# Patient Record
Sex: FEMALE | Race: WHITE | NOT HISPANIC OR LATINO | URBAN - METROPOLITAN AREA
[De-identification: names, ages, dates, MRNs, and addresses within clinical notes are randomized per-mention and may not be internally consistent; named-entity substitution may affect disease eponyms.]

---

## 2017-03-23 ENCOUNTER — APPOINTMENT (OUTPATIENT)
Dept: WOMENS IMAGING | Facility: HOSPITAL | Age: 57
End: 2017-03-23

## 2017-03-23 PROCEDURE — 77063 BREAST TOMOSYNTHESIS BI: CPT | Performed by: RADIOLOGY

## 2017-03-23 PROCEDURE — 77067 SCR MAMMO BI INCL CAD: CPT | Performed by: RADIOLOGY

## 2017-03-23 PROCEDURE — G0202 SCR MAMMO BI INCL CAD: HCPCS | Performed by: RADIOLOGY

## 2017-08-17 ENCOUNTER — OFFICE VISIT (OUTPATIENT)
Dept: ORTHOPEDIC SURGERY | Facility: CLINIC | Age: 57
End: 2017-08-17

## 2017-08-17 VITALS
WEIGHT: 120 LBS | HEIGHT: 61 IN | HEART RATE: 65 BPM | BODY MASS INDEX: 22.66 KG/M2 | SYSTOLIC BLOOD PRESSURE: 132 MMHG | DIASTOLIC BLOOD PRESSURE: 85 MMHG

## 2017-08-17 DIAGNOSIS — R52 PAIN: Primary | ICD-10-CM

## 2017-08-17 DIAGNOSIS — M77.11 LATERAL EPICONDYLITIS, RIGHT ELBOW: ICD-10-CM

## 2017-08-17 PROCEDURE — 73070 X-RAY EXAM OF ELBOW: CPT | Performed by: ORTHOPAEDIC SURGERY

## 2017-08-17 PROCEDURE — 20605 DRAIN/INJ JOINT/BURSA W/O US: CPT | Performed by: ORTHOPAEDIC SURGERY

## 2017-08-17 PROCEDURE — 99203 OFFICE O/P NEW LOW 30 MIN: CPT | Performed by: ORTHOPAEDIC SURGERY

## 2017-08-17 RX ORDER — BETAMETHASONE SODIUM PHOSPHATE AND BETAMETHASONE ACETATE 3; 3 MG/ML; MG/ML
6 INJECTION, SUSPENSION INTRA-ARTICULAR; INTRALESIONAL; INTRAMUSCULAR; SOFT TISSUE
Status: COMPLETED | OUTPATIENT
Start: 2017-08-17 | End: 2017-08-17

## 2017-08-17 RX ORDER — CETIRIZINE HYDROCHLORIDE 10 MG/1
10 TABLET ORAL DAILY
COMMUNITY

## 2017-08-17 RX ORDER — LIDOCAINE HYDROCHLORIDE 10 MG/ML
1 INJECTION, SOLUTION EPIDURAL; INFILTRATION; INTRACAUDAL; PERINEURAL
Status: COMPLETED | OUTPATIENT
Start: 2017-08-17 | End: 2017-08-17

## 2017-08-17 RX ORDER — NABUMETONE 500 MG/1
500 TABLET, FILM COATED ORAL 2 TIMES DAILY
Qty: 60 TABLET | Refills: 5 | Status: SHIPPED | OUTPATIENT
Start: 2017-08-17 | End: 2018-01-13

## 2017-08-17 RX ADMIN — BETAMETHASONE SODIUM PHOSPHATE AND BETAMETHASONE ACETATE 6 MG: 3; 3 INJECTION, SUSPENSION INTRA-ARTICULAR; INTRALESIONAL; INTRAMUSCULAR; SOFT TISSUE at 13:50

## 2017-08-17 RX ADMIN — LIDOCAINE HYDROCHLORIDE 1 ML: 10 INJECTION, SOLUTION EPIDURAL; INFILTRATION; INTRACAUDAL; PERINEURAL at 13:50

## 2017-08-17 NOTE — PROGRESS NOTES
Subjective: Right elbow pain     Patient ID: Ramandeep Holbrook is a 57 y.o. female.    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  Significant pain with carrying any type of heavy object.  Has taken occasional anti-inflammatories although most tender onset her stomach.  She has worn tennis elbow band without significant relief       Social History     Occupational History   • Not on file.     Social History Main Topics   • Smoking status: Never Smoker   • Smokeless tobacco: Not on file   • Alcohol use Yes   • Drug use: Not on file   • Sexual activity: Not on file      Review of Systems   Constitutional: Positive for diaphoresis and unexpected weight change. Negative for chills and fever.   HENT: Negative for hearing loss, nosebleeds, sore throat and tinnitus.    Eyes: Negative for pain and visual disturbance.   Respiratory: Negative for cough, shortness of breath and wheezing.    Cardiovascular: Negative for chest pain and palpitations.   Gastrointestinal: Negative for abdominal pain, diarrhea, nausea and vomiting.   Endocrine: Negative for cold intolerance, heat intolerance and polydipsia.   Genitourinary: Negative for difficulty urinating, dysuria and hematuria.   Musculoskeletal: Positive for joint swelling. Negative for arthralgias and myalgias.   Skin: Negative for rash and wound.   Allergic/Immunologic: Negative for environmental allergies.   Neurological: Negative for dizziness, syncope and numbness.   Hematological: Does not bruise/bleed easily.   Psychiatric/Behavioral: Negative for dysphoric mood and sleep disturbance. The patient is not nervous/anxious.           Past Medical History:   Diagnosis Date   • Fracture of wrist    • Fracture, foot      History reviewed. No pertinent surgical history.  Family History   Problem Relation Age of Onset   • Cancer Mother    • Cancer Maternal Aunt    • Diabetes Paternal Aunt    • Cancer Paternal Aunt          Objective:  Vitals:    08/17/17 1314   BP: 132/85   Pulse: 65     Last 3 weights    08/17/17  1314   Weight: 120 lb (54.4 kg)     Body mass index is 22.67 kg/(m^2).       Ortho Exam  AP lateral x-rays of the elbow to evaluate her chief complaint is completely within normal limits.  She is alert and oriented ×3.  Head is eyes nose and throat are normocephalic and pupils equal round reactive to light sclerae clear.  Right upper extremity shows no motor deficit good distal pulses.  Ulnar, radial and median nerve function is intact.  Is marked tenderness over the lateral upper condyle his pain with dorsi flexion and supination against resistance.  Skin is cool to touch and his no sensory loss.  She has full range of motion of the elbow and the shoulder.  Deltoid function is 5 over 5.  Biceps function is 5 over 5.  No significant swelling indicating any type of vascular compromise.  Again most nonsteroidals upset her stomach.    Assessment:       1. Pain    2. Lateral epicondylitis, right elbow          Plan:      reviewed the x-ray findings and physical findings with the patient.  I'll continue wearing the tennis elbow band and instructed her how to wear down the arm.  He her instructions on stretching exercises in the shower.  We'll begin Relafen 500 mg 2 pills once a day with food.  Also will inject the right lateral upper condyle and that was accomplished after sterile prepping with 1 cc lidocaine 1 cc Celestone.  Postinjection instructions given to the patient.  Return to see me in 4 weeks      Work Status:    [facility] query complete.    Orders:  Orders Placed This Encounter   Procedures   • Medium Joint Arthrocentesis   • XR Elbow  2 View Right       Medications:  New Medications Ordered This Visit   Medications   • sertraline (ZOLOFT) 50 MG tablet   • Multiple Vitamin (MULTI VITAMIN DAILY PO)     Sig: Take  by mouth.   • cetirizine (zyrTEC) 10 MG tablet     Sig: Take 10 mg by mouth Daily.   • nabumetone (RELAFEN) 500 MG tablet     Sig: Take 1 tablet by mouth 2 (Two) Times a Day.     Dispense:  60 tablet     Refill:  5       Followup:  Return in about 4 weeks (around 9/14/2017).          Dragon transcription disclaimer     Much of this encounter note is an electronic transcription/translation of spoken language to printed text. The electronic translation of spoken language may permit erroneous, or at times, nonsensical words or phrases to be inadvertently transcribed. Although I have reviewed the note for such errors, some may still exist.  Medium Joint Arthrocentesis  Date/Time: 8/17/2017 1:50 PM  Consent given by: patient  Site marked: site marked  Timeout: Immediately prior to procedure a time out was called to verify the correct patient, procedure, equipment, support staff and site/side marked as required   Supporting Documentation  Indications: pain   Procedure Details  Location: elbow - R elbow  Preparation: Patient was prepped and draped in the usual sterile fashion  Needle size: 22 G  Approach: anterolateral  Medications administered: 1 mL lidocaine PF 1% 1 %; 6 mg betamethasone acetate-betamethasone sodium phosphate 6 (3-3) MG/ML  Patient tolerance: patient tolerated the procedure well with no immediate complications

## 2017-09-25 ENCOUNTER — TELEPHONE (OUTPATIENT)
Dept: ORTHOPEDIC SURGERY | Facility: CLINIC | Age: 57
End: 2017-09-25

## 2017-09-25 NOTE — TELEPHONE ENCOUNTER
Injection helped for about 4 weeks. Pain is back. Can she get another one or what do you recommend?  She took the Relafen for about 3 weeks and then stopped it.  She started back on it but has not helped.

## 2017-09-25 NOTE — TELEPHONE ENCOUNTER
She wants to give the medicine longer to work and if no improvement, will call back for the scheduling of the MRI.

## 2018-01-13 ENCOUNTER — OFFICE VISIT (OUTPATIENT)
Dept: RETAIL CLINIC | Facility: CLINIC | Age: 58
End: 2018-01-13

## 2018-01-13 VITALS
HEART RATE: 96 BPM | TEMPERATURE: 97.7 F | SYSTOLIC BLOOD PRESSURE: 119 MMHG | DIASTOLIC BLOOD PRESSURE: 64 MMHG | OXYGEN SATURATION: 97 %

## 2018-01-13 DIAGNOSIS — J01.90 ACUTE SINUSITIS, RECURRENCE NOT SPECIFIED, UNSPECIFIED LOCATION: Primary | ICD-10-CM

## 2018-01-13 DIAGNOSIS — J98.01 BRONCHOSPASM: ICD-10-CM

## 2018-01-13 DIAGNOSIS — R50.9 FEVER, UNSPECIFIED FEVER CAUSE: ICD-10-CM

## 2018-01-13 LAB
EXPIRATION DATE: NORMAL
FLUAV AG NPH QL: NORMAL
FLUBV AG NPH QL: NORMAL
INTERNAL CONTROL: NORMAL
Lab: NORMAL

## 2018-01-13 PROCEDURE — 87804 INFLUENZA ASSAY W/OPTIC: CPT | Performed by: NURSE PRACTITIONER

## 2018-01-13 PROCEDURE — 99213 OFFICE O/P EST LOW 20 MIN: CPT | Performed by: NURSE PRACTITIONER

## 2018-01-13 RX ORDER — AMOXICILLIN 875 MG/1
875 TABLET, COATED ORAL EVERY 12 HOURS SCHEDULED
Qty: 20 TABLET | Refills: 0 | Status: SHIPPED | OUTPATIENT
Start: 2018-01-13 | End: 2018-01-23

## 2018-01-13 RX ORDER — ALBUTEROL SULFATE 90 UG/1
2 AEROSOL, METERED RESPIRATORY (INHALATION) EVERY 4 HOURS PRN
Qty: 1 INHALER | Refills: 0 | Status: SHIPPED | OUTPATIENT
Start: 2018-01-13

## 2018-01-13 RX ORDER — FLUCONAZOLE 100 MG/1
100 TABLET ORAL ONCE
Qty: 2 TABLET | Refills: 0 | Status: SHIPPED | OUTPATIENT
Start: 2018-01-13 | End: 2018-01-13

## 2018-01-13 NOTE — PATIENT INSTRUCTIONS

## 2018-01-13 NOTE — PROGRESS NOTES
Melissa Holbrook is a 57 y.o.. female.     Fever    This is a new problem. The current episode started in the past 7 days. The problem has been unchanged. Associated symptoms include abdominal pain, congestion, coughing, diarrhea (Thursday), ear pain (full), headaches and a sore throat. Pertinent negatives include no nausea or vomiting. Treatments tried: cough/cold otc med. The treatment provided no relief.       The following portions of the patient's history were reviewed and updated as appropriate: allergies, current medications, past family history, past medical history, past social history, past surgical history and problem list.    Review of Systems   Constitutional: Positive for fever (100.0 ).   HENT: Positive for congestion, ear pain (full), rhinorrhea and sore throat.    Eyes: Negative.    Respiratory: Positive for cough.    Gastrointestinal: Positive for abdominal pain and diarrhea (Thursday). Negative for nausea and vomiting.   Genitourinary: Negative.    Musculoskeletal: Positive for arthralgias.   Neurological: Positive for headaches.       Objective     Vitals:    01/13/18 1502   BP: 119/64   Pulse: 96   Temp: 97.7 °F (36.5 °C)   TempSrc: Tympanic   SpO2: 97%       Physical Exam   Constitutional: She is oriented to person, place, and time. She appears well-developed and well-nourished.   HENT:   Head: Normocephalic and atraumatic.   Right Ear: Tympanic membrane is not erythematous.   Left Ear: Tympanic membrane normal. Tympanic membrane is not erythematous.   Nose: Mucosal edema present. Right sinus exhibits frontal sinus tenderness. Right sinus exhibits no maxillary sinus tenderness. Left sinus exhibits frontal sinus tenderness. Left sinus exhibits no maxillary sinus tenderness.   Mouth/Throat: Posterior oropharyngeal erythema (slight) present.   Right TM with clear fluid noted  PND   Eyes: Conjunctivae are normal. Pupils are equal, round, and reactive to light.   Cardiovascular: Normal rate  and regular rhythm.    No murmur heard.  Pulmonary/Chest: Effort normal. She has no wheezes. She has no rhonchi. She has no rales.   Musculoskeletal: Normal range of motion.   Lymphadenopathy:     She has cervical adenopathy.   Neurological: She is alert and oriented to person, place, and time.   Skin: Skin is warm and dry.   Vitals reviewed.      Lab Results (last 24 hours)     Procedure Component Value Units Date/Time    POC Influenza A / B [[id number]]  (Normal) Collected:  01/13/18 1516    Specimen:  Swab Updated:  01/13/18 1517     Rapid Influenza A Ag neg     Rapid Influenza B Ag neg     Internal Control Passed     Lot Number [id number]     Expiration Date 2019-12          Assessment/Plan   Ramandeep was seen today for fever.    Diagnoses and all orders for this visit:    Acute sinusitis, recurrence not specified, unspecified location  -     amoxicillin (AMOXIL) 875 MG tablet; Take 1 tablet by mouth Every 12 (Twelve) Hours for 10 days.  -     fluconazole (DIFLUCAN) 100 MG tablet; Take 1 tablet by mouth 1 (One) Time for 1 dose. After completion of amoxicillin; may repeat in 72 hours if needed    Fever, unspecified fever cause  -     POC Influenza A / B    Bronchospasm  -     albuterol (PROVENTIL HFA;VENTOLIN HFA) 108 (90 Base) MCG/ACT inhaler; Inhale 2 puffs Every 4 (Four) Hours As Needed for Wheezing or Shortness of Air (coughing episodes).        Patient Instructions   Sinusitis, Adult  Sinusitis is soreness and inflammation of your sinuses. Sinuses are hollow spaces in the bones around your face. Your sinuses are located:  · Around your eyes.  · In the middle of your forehead.  · Behind your nose.  · In your cheekbones.  Your sinuses and nasal passages are lined with a stringy fluid (mucus). Mucus normally drains out of your sinuses. When your nasal tissues become inflamed or swollen, the mucus can become trapped or blocked so air cannot flow through your sinuses. This allows bacteria, viruses, and funguses to grow,  which leads to infection.  Sinusitis can develop quickly and last for 7?10 days (acute) or for more than 12 weeks (chronic). Sinusitis often develops after a cold.  What are the causes?  This condition is caused by anything that creates swelling in the sinuses or stops mucus from draining, including:  · Allergies.  · Asthma.  · Bacterial or viral infection.  · Abnormally shaped bones between the nasal passages.  · Nasal growths that contain mucus (nasal polyps).  · Narrow sinus openings.  · Pollutants, such as chemicals or irritants in the air.  · A foreign object stuck in the nose.  · A fungal infection. This is rare.  What increases the risk?  The following factors may make you more likely to develop this condition:  · Having allergies or asthma.  · Having had a recent cold or respiratory tract infection.  · Having structural deformities or blockages in your nose or sinuses.  · Having a weak immune system.  · Doing a lot of swimming or diving.  · Overusing nasal sprays.  · Smoking.  What are the signs or symptoms?  The main symptoms of this condition are pain and a feeling of pressure around the affected sinuses. Other symptoms include:  · Upper toothache.  · Earache.  · Headache.  · Bad breath.  · Decreased sense of smell and taste.  · A cough that may get worse at night.  · Fatigue.  · Fever.  · Thick drainage from your nose. The drainage is often green and it may contain pus (purulent).  · Stuffy nose or congestion.  · Postnasal drip. This is when extra mucus collects in the throat or back of the nose.  · Swelling and warmth over the affected sinuses.  · Sore throat.  · Sensitivity to light.  How is this diagnosed?  This condition is diagnosed based on symptoms, a medical history, and a physical exam. To find out if your condition is acute or chronic, your health care provider may:  · Look in your nose for signs of nasal polyps.  · Tap over the affected sinus to check for signs of infection.  · View the inside  of your sinuses using an imaging device that has a light attached (endoscope).  If your health care provider suspects that you have chronic sinusitis, you may also:  · Be tested for allergies.  · Have a sample of mucus taken from your nose (nasal culture) and checked for bacteria.  · Have a mucus sample examined to see if your sinusitis is related to an allergy.  If your sinusitis does not respond to treatment and it lasts longer than 8 weeks, you may have an MRI or CT scan to check your sinuses. These scans also help to determine how severe your infection is.  In rare cases, a bone biopsy may be done to rule out more serious types of fungal sinus disease.  How is this treated?  Treatment for sinusitis depends on the cause and whether your condition is chronic or acute. If a virus is causing your sinusitis, your symptoms will go away on their own within 10 days. You may be given medicines to relieve your symptoms, including:  · Topical nasal decongestants. They shrink swollen nasal passages and let mucus drain from your sinuses.  · Antihistamines. These drugs block inflammation that is triggered by allergies. This can help to ease swelling in your nose and sinuses.  · Topical nasal corticosteroids. These are nasal sprays that ease inflammation and swelling in your nose and sinuses.  · Nasal saline washes. These rinses can help to get rid of thick mucus in your nose.  If your condition is caused by bacteria, you will be given an antibiotic medicine. If your condition is caused by a fungus, you will be given an antifungal medicine.  Surgery may be needed to correct underlying conditions, such as narrow nasal passages. Surgery may also be needed to remove polyps.  Follow these instructions at home:  Medicines  · Take, use, or apply over-the-counter and prescription medicines only as told by your health care provider. These may include nasal sprays.  · If you were prescribed an antibiotic medicine, take it as told by  your health care provider. Do not stop taking the antibiotic even if you start to feel better.  Hydrate and Humidify  · Drink enough water to keep your urine clear or pale yellow. Staying hydrated will help to thin your mucus.  · Use a cool mist humidifier to keep the humidity level in your home above 50%.  · Inhale steam for 10-15 minutes, 3-4 times a day or as told by your health care provider. You can do this in the bathroom while a hot shower is running.  · Limit your exposure to cool or dry air.  Rest  · Rest as much as possible.  · Sleep with your head raised (elevated).  · Make sure to get enough sleep each night.  General instructions  · Apply a warm, moist washcloth to your face 3-4 times a day or as told by your health care provider. This will help with discomfort.  · Wash your hands often with soap and water to reduce your exposure to viruses and other germs. If soap and water are not available, use hand .  · Do not smoke. Avoid being around people who are smoking (secondhand smoke).  · Keep all follow-up visits as told by your health care provider. This is important.  Contact a health care provider if:  · You have a fever.  · Your symptoms get worse.  · Your symptoms do not improve within 10 days.  Get help right away if:  · You have a severe headache.  · You have persistent vomiting.  · You have pain or swelling around your face or eyes.  · You have vision problems.  · You develop confusion.  · Your neck is stiff.  · You have trouble breathing.  This information is not intended to replace advice given to you by your health care provider. Make sure you discuss any questions you have with your health care provider.  Document Released: 12/18/2006 Document Revised: 08/13/2017 Document Reviewed: 10/12/2016  [employer] Interactive Patient Education © 2017 [employer]        Return if symptoms worsen or fail to improve with urgent care/ER.

## 2018-03-26 ENCOUNTER — APPOINTMENT (OUTPATIENT)
Dept: WOMENS IMAGING | Facility: HOSPITAL | Age: 58
End: 2018-03-26

## 2018-03-26 PROCEDURE — 77067 SCR MAMMO BI INCL CAD: CPT | Performed by: RADIOLOGY

## 2019-05-07 ENCOUNTER — APPOINTMENT (OUTPATIENT)
Dept: WOMENS IMAGING | Facility: HOSPITAL | Age: 59
End: 2019-05-07

## 2019-05-07 PROCEDURE — 77067 SCR MAMMO BI INCL CAD: CPT | Performed by: RADIOLOGY

## 2022-03-29 ENCOUNTER — APPOINTMENT (OUTPATIENT)
Dept: WOMENS IMAGING | Facility: HOSPITAL | Age: 62
End: 2022-03-29

## 2022-03-29 PROCEDURE — 77067 SCR MAMMO BI INCL CAD: CPT | Performed by: RADIOLOGY

## 2022-03-29 PROCEDURE — 77063 BREAST TOMOSYNTHESIS BI: CPT | Performed by: RADIOLOGY

## 2023-08-03 ENCOUNTER — OFFICE VISIT (OUTPATIENT)
Dept: ORTHOPEDIC SURGERY | Facility: CLINIC | Age: 63
End: 2023-08-03
Payer: COMMERCIAL

## 2023-08-03 VITALS — BODY MASS INDEX: 22.14 KG/M2 | WEIGHT: 120.3 LBS | TEMPERATURE: 97.3 F | HEIGHT: 62 IN

## 2023-08-03 DIAGNOSIS — M25.869 PATELLOFEMORAL DYSFUNCTION, UNSPECIFIED LATERALITY: ICD-10-CM

## 2023-08-03 DIAGNOSIS — M25.562 PAIN IN BOTH KNEES, UNSPECIFIED CHRONICITY: Primary | ICD-10-CM

## 2023-08-03 DIAGNOSIS — M25.561 PAIN IN BOTH KNEES, UNSPECIFIED CHRONICITY: Primary | ICD-10-CM

## 2023-08-03 RX ORDER — IBUPROFEN 200 MG
200 TABLET ORAL EVERY 6 HOURS PRN
COMMUNITY

## 2023-08-03 RX ORDER — METHYLPREDNISOLONE ACETATE 80 MG/ML
1 INJECTION, SUSPENSION INTRA-ARTICULAR; INTRALESIONAL; INTRAMUSCULAR; SOFT TISSUE
Status: COMPLETED | OUTPATIENT
Start: 2023-08-03 | End: 2023-08-03

## 2023-08-03 RX ORDER — LIDOCAINE HYDROCHLORIDE 10 MG/ML
2 INJECTION, SOLUTION EPIDURAL; INFILTRATION; INTRACAUDAL; PERINEURAL
Status: COMPLETED | OUTPATIENT
Start: 2023-08-03 | End: 2023-08-03

## 2023-08-03 RX ADMIN — METHYLPREDNISOLONE ACETATE 1 ML: 80 INJECTION, SUSPENSION INTRA-ARTICULAR; INTRALESIONAL; INTRAMUSCULAR; SOFT TISSUE at 11:31

## 2023-08-03 RX ADMIN — LIDOCAINE HYDROCHLORIDE 2 ML: 10 INJECTION, SOLUTION EPIDURAL; INFILTRATION; INTRACAUDAL; PERINEURAL at 11:31

## 2023-08-23 ENCOUNTER — HOSPITAL ENCOUNTER (OUTPATIENT)
Dept: PHYSICAL THERAPY | Facility: HOSPITAL | Age: 63
Setting detail: THERAPIES SERIES
Discharge: HOME | End: 2023-08-23
Payer: COMMERCIAL

## 2023-08-23 DIAGNOSIS — M25.861 PATELLOFEMORAL DYSFUNCTION OF RIGHT KNEE: Primary | ICD-10-CM

## 2023-08-23 PROCEDURE — 97110 THERAPEUTIC EXERCISES: CPT | Performed by: PHYSICAL THERAPIST

## 2023-08-23 PROCEDURE — 97161 PT EVAL LOW COMPLEX 20 MIN: CPT | Performed by: PHYSICAL THERAPIST

## 2023-08-23 NOTE — THERAPY EVALUATION
Outpatient Physical Therapy Ortho Initial Evaluation  RADHA Gallo     Patient Name: Ramandeep Holbrook  : 1960  MRN: [id number]  Today's Date: 2023      Visit Date: 2023    There is no problem list on file for this patient.       Past Medical History:   Diagnosis Date    Fracture of wrist     Fracture, foot         History reviewed. No pertinent surgical history.    Visit Dx:     ICD-10-CM ICD-9-CM   1. Patellofemoral dysfunction of right knee  M25.861 719.86          Patient History       Row Name 23 1700             History    Chief Complaint Pain;Difficulty with daily activities  -SP      Type of Pain Knee pain  -SP      Date Current Problem(s) Began 23  MD visit/order date  -SP      Brief Description of Current Complaint Patient with gradual onset of insidious right knee pain over the last few month.  She is active in [employer] and [employer] classes, but now has some limitation in participation due to knee pain.  Patient reports that she had recent injection to her knee and it has changed her symptoms minimally.X-rays show mild degenerative arthritic changes.  Patient describes medial aspect knee pain intermittently  -SP      Previous treatment for THIS PROBLEM Injections  -SP      Patient/Caregiver Goals Return to prior level of function;Know what to do to help the symptoms  -SP      Patient's Rating of General Health Very good  -SP      Occupation/sports/leisure activities [employer], [employer] classes, gardening  -SP      How has patient tried to help current problem? ice  -SP      What clinical tests have you had for this problem? X-ray  -SP      Results of Clinical Tests see chart  -SP         Pain     Pain Location Knee  -SP      Pain at Present 0  -SP      Pain at Worst 3  -SP      Pain Frequency Intermittent  -SP      Pain Description Sharp;Aching  -SP      What Performance Factors Make the Current Problem(s) WORSE? pilates stretches into extreme end range flexion of knee.  -SP      What  Performance Factors Make the Current Problem(s) BETTER? ice  -SP      Tolerance Time- Standing unlimited  -SP      Tolerance Time- Sitting unlimited  -SP      Tolerance Time- Walking unlimited  -SP      Is your sleep disturbed? No  -SP      Difficulties with ADL's? able to perform ADLs unlimited  -SP      Difficulties with recreational activities? difficulty participating fully in exercise classes  -SP         Fall Risk Assessment    Any falls in the past year: No  -SP         Daily Activities    Primary Language English  -SP      Are you able to read Yes  -SP      Are you able to write Yes  -SP      How does patient learn best? Listening;Reading;Demonstration;Pictures/Video  -SP      Teaching needs identified Home Exercise Program;Management of Condition  -SP      Patient is concerned about/has problems with Performing sports, recreation, and play activities  -SP      Does patient have problems with the following? Anxiety  -SP      Barriers to learning None  -SP      Pt Participated in POC and Goals Yes  -SP         Safety    Are you being hurt, hit, or frightened by anyone at home or in your life? No  -SP      Are you being neglected by a caregiver No  -SP                User Key  (r) = Recorded By, (t) = Taken By, (c) = Cosigned By      Initials Name Provider Type    SP Angella Ca, PT Physical Therapist                     PT Ortho       Row Name 08/23/23 1700       Posture/Observations    Observations Edema  -SP    Posture/Observations Comments mild edema presents medial knee  -SP       Knee Palpation    Medial Joint Line Right:;Tender  -SP       Patellar Accessory Motions    Superior glide Right:;WNL  -SP    Inferior glide Right:;WNL  -SP    Medial glide Right:;WNL  -SP    Lateral glide Right:;WNL  -SP       Knee Special Tests    Anterior drawer (ACL lesion) Right:;Negative  -SP    Valgus stress (MCL lesion) Right:;Negative  -SP    Varus stress (LCL lesion) Right:;Negative  -SP    Apley's  distraction test (meniscal lesion vs OA) Right:;Negative  -SP    Apley's compression test (meniscal lesion vs OA) Right:;Negative  -SP    Agnes's test (meniscal lesion) Right:;Negative  -SP    Bounce home test (meniscal lesion) Right:;Negative  -SP       Right Lower Ext    Rt Hip ABduction AROM WNL  -SP    Rt Hip ADduction AROM WNL  -SP    Rt Hip Extension AROM WNL  -SP    Rt Hip Flexion AROM WNL  -SP    Rt Knee Extension/Flexion AROM WNL  -SP    Rt Ankle Dorsiflexion AROM WNL  -SP    Rt Ankle Plantarflexion AROM WNL  -SP       MMT Right Lower Ext    Rt Hip Flexion MMT, Gross Movement (4+/5) good plus  -SP    Rt Hip Extension MMT, Gross Movement (4+/5) good plus  -SP    Rt Hip ABduction MMT, Gross Movement (4+/5) good plus  -SP    Rt Hip ADduction MMT, Gross Movement (4+/5) good plus  -SP    Rt Knee Extension MMT, Gross Movement (4-/5) good minus  -SP    Rt Knee Flexion MMT, Gross Movement (4-/5) good minus  -SP    Rt Ankle Plantarflexion MMT, Gross Movement (4/5) good  -SP    Rt Ankle Dorsiflexion MMT, Gross Movement (4/5) good  -SP       Sensation    Light Touch No apparent deficits  -SP       Lower Extremity Flexibility    Hamstrings Bilateral:;Moderately limited  -SP    ITB Bilateral:;Moderately limited  -SP       Transfers    Bed-Chair Sharp (Transfers) independent  -SP    Chair-Bed Sharp (Transfers) independent  -SP    Sit-Stand Sharp (Transfers) independent  -SP    Stand-Sit Sharp (Transfers) independent  -SP       Gait/Stairs (Locomotion)    Sharp Level (Gait) independent  -SP    Comment, (Gait/Stairs) Patient ambulates with normal gait pattern  -SP              User Key  (r) = Recorded By, (t) = Taken By, (c) = Cosigned By      Initials Name Provider Type    Angella Franklin, PT Physical Therapist                                Therapy Education  Given: HEP  Program: New  How Provided: Verbal, Written  Provided to: Patient  Level of Understanding: Verbalized,  Demonstrated      PT OP Goals       Row Name 08/23/23 1800          PT Short Term Goals    STG 1 Patient demonstrates independence with HEP to improve LE mobility and strengthening  -SP        Time Calculation    PT Goal Re-Cert Due Date 09/22/23  -SP               User Key  (r) = Recorded By, (t) = Taken By, (c) = Cosigned By      Initials Name Provider Type    Angella Franklin, PT Physical Therapist                     PT Assessment/Plan       Row Name 08/23/23 1805          PT Assessment    Assessment Comments Patient with chronic history of right knee pain that has gradually worsened over the last few months.  Imaging shows mild arthritic changes in knee and patellofemoral dysfuntion. Patient presents for instruction in HEP.  Patient demonstrates knowledge of, and appropriate technique with HEP  -SP     Please refer to paper survey for additional self-reported information Yes  -SP     Rehab Potential Good  -SP     Patient would benefit from skilled therapy intervention Yes  -SP        PT Plan    Predicted Duration of Therapy Intervention (PT) evaluation/ instruction in HEP;  1 visit  -SP     PT Plan Comments Patient to continue with HEP 3-4 x week progression repetitions as able  -SP               User Key  (r) = Recorded By, (t) = Taken By, (c) = Cosigned By      Initials Name Provider Type    Angella Franklin, PT Physical Therapist                       OP Exercises       Row Name 08/23/23 1808 08/23/23 1800          Total Minutes    40337 - PT Therapeutic Exercise Minutes 30  -SP --        Exercise 1    Exercise Name 1 -- hamstring stretch  -SP     Cueing 1 -- Verbal;Tactile  -SP     Reps 1 -- 3  -SP     Time 1 -- 20 sec  -SP        Exercise 2    Exercise Name 2 -- ITBand stretch  -SP     Cueing 2 -- Verbal;Tactile  -SP     Reps 2 -- 3  -SP     Time 2 -- 20 sec  -SP        Exercise 3    Exercise Name 3 -- quad set with ball squeeze  -SP     Cueing 3 -- Verbal;Tactile  -SP     Reps 3 --  10  -SP     Time 3 -- 5 sec  -SP        Exercise 4    Exercise Name 4 -- SLR with ER/ SLR  -SP     Cueing 4 -- Verbal;Tactile  -SP     Reps 4 -- 20/20  -SP        Exercise 5    Exercise Name 5 -- sidelying clams  -SP     Cueing 5 -- Verbal;Tactile  -SP     Reps 5 -- 20  -SP     Additional Comments -- blue tband  -SP        Exercise 6    Exercise Name 6 -- bridges with tband  -SP     Cueing 6 -- Verbal;Tactile  -SP     Reps 6 -- 20  -SP     Additional Comments -- blue tband  -SP        Exercise 7    Exercise Name 7 -- sit to stand/squat  -SP     Cueing 7 -- Verbal;Demo  -SP     Reps 7 -- 20  -SP     Additional Comments -- blue tband  -SP        Exercise 8    Exercise Name 8 -- TKE  -SP     Cueing 8 -- Verbal;Demo  -SP     Reps 8 -- 30  -SP     Additional Comments -- gold tband  -SP               User Key  (r) = Recorded By, (t) = Taken By, (c) = Cosigned By      Initials Name Provider Type    Angella Franklin, PT Physical Therapist                                            Time Calculation:     Start Time: 1050  Stop Time: 1200  Time Calculation (min): 70 min  Timed Charges  74184 - PT Therapeutic Exercise Minutes: 30  Untimed Charges  PT Eval/Re-eval Minutes: 30  Total Minutes  Timed Charges Total Minutes: 30  Untimed Charges Total Minutes: 30   Total Minutes: 60     Therapy Charges for Today       Code Description Service Date Service Provider Modifiers Qty    38962734910 HC PT THER PROC EA 15 MIN 8/23/2023 Angella Ca, PT GP 2    00411213794 HC PT EVAL LOW COMPLEXITY 2 8/23/2023 Angella Ca, PT GP 1                      Angella Ca, PT  8/23/2023

## 2024-06-18 ENCOUNTER — APPOINTMENT (OUTPATIENT)
Dept: WOMENS IMAGING | Facility: HOSPITAL | Age: 64
End: 2024-06-18
Payer: COMMERCIAL

## 2024-06-18 PROCEDURE — G0279 TOMOSYNTHESIS, MAMMO: HCPCS | Performed by: RADIOLOGY

## 2024-06-18 PROCEDURE — 76642 ULTRASOUND BREAST LIMITED: CPT | Performed by: RADIOLOGY

## 2024-06-18 PROCEDURE — 77066 DX MAMMO INCL CAD BI: CPT | Performed by: RADIOLOGY

## 2024-06-18 PROCEDURE — 77062 BREAST TOMOSYNTHESIS BI: CPT | Performed by: RADIOLOGY

## 2024-12-03 ENCOUNTER — TRANSCRIBE ORDERS (OUTPATIENT)
Dept: BONE DENSITY | Facility: HOSPITAL | Age: 64
End: 2024-12-03
Payer: COMMERCIAL

## 2024-12-03 DIAGNOSIS — Z13.820 ENCOUNTER FOR OSTEOPOROSIS SCREENING IN ASYMPTOMATIC POSTMENOPAUSAL PATIENT: Primary | ICD-10-CM

## 2024-12-03 DIAGNOSIS — Z78.0 ENCOUNTER FOR OSTEOPOROSIS SCREENING IN ASYMPTOMATIC POSTMENOPAUSAL PATIENT: Primary | ICD-10-CM

## 2024-12-09 ENCOUNTER — APPOINTMENT (OUTPATIENT)
Dept: BONE DENSITY | Facility: HOSPITAL | Age: 64
End: 2024-12-09
Payer: COMMERCIAL

## 2024-12-09 DIAGNOSIS — Z13.820 ENCOUNTER FOR OSTEOPOROSIS SCREENING IN ASYMPTOMATIC POSTMENOPAUSAL PATIENT: ICD-10-CM

## 2024-12-09 DIAGNOSIS — Z78.0 ENCOUNTER FOR OSTEOPOROSIS SCREENING IN ASYMPTOMATIC POSTMENOPAUSAL PATIENT: ICD-10-CM

## 2024-12-09 PROCEDURE — 77080 DXA BONE DENSITY AXIAL: CPT

## 2025-03-06 ENCOUNTER — TELEPHONE (OUTPATIENT)
Dept: SURGERY | Facility: CLINIC | Age: 65
End: 2025-03-06
Payer: COMMERCIAL

## 2025-03-06 NOTE — TELEPHONE ENCOUNTER
LM for pt to call to sched OV to see Dr Teran - dx: (+) Elizabeth.  Pt lives in [place] - pls schedule at that office.    HUB: ok to sched pt as stated above (new pt)

## 2025-03-07 ENCOUNTER — DOCUMENTATION (OUTPATIENT)
Dept: SURGERY | Facility: CLINIC | Age: 65
End: 2025-03-07
Payer: COMMERCIAL

## 2025-03-07 NOTE — PROGRESS NOTES
Referral received from Dr. Silvestre for office appointment due to + COLOGUARD.  2nd voicemail left for patient to call and schedule an appointment with Dr. Teran at our [place] location.

## 2025-07-03 ENCOUNTER — APPOINTMENT (OUTPATIENT)
Dept: WOMENS IMAGING | Facility: HOSPITAL | Age: 65
End: 2025-07-03
Payer: COMMERCIAL

## 2025-07-03 PROCEDURE — 77067 SCR MAMMO BI INCL CAD: CPT | Performed by: RADIOLOGY

## 2025-07-03 PROCEDURE — 77063 BREAST TOMOSYNTHESIS BI: CPT | Performed by: RADIOLOGY
